# Patient Record
Sex: FEMALE | Race: WHITE | Employment: OTHER | URBAN - METROPOLITAN AREA
[De-identification: names, ages, dates, MRNs, and addresses within clinical notes are randomized per-mention and may not be internally consistent; named-entity substitution may affect disease eponyms.]

---

## 2022-11-23 ENCOUNTER — APPOINTMENT (OUTPATIENT)
Dept: CT IMAGING | Age: 72
End: 2022-11-23
Payer: COMMERCIAL

## 2022-11-23 ENCOUNTER — APPOINTMENT (OUTPATIENT)
Dept: GENERAL RADIOLOGY | Age: 72
End: 2022-11-23
Payer: COMMERCIAL

## 2022-11-23 ENCOUNTER — HOSPITAL ENCOUNTER (EMERGENCY)
Age: 72
Discharge: HOME | End: 2022-11-24
Attending: EMERGENCY MEDICINE
Payer: COMMERCIAL

## 2022-11-23 DIAGNOSIS — U07.1 COVID-19: Primary | ICD-10-CM

## 2022-11-23 LAB
ABSOLUTE EOS #: 0.04 K/UL (ref 0–0.44)
ABSOLUTE IMMATURE GRANULOCYTE: 0.16 K/UL (ref 0–0.3)
ABSOLUTE LYMPH #: 1.02 K/UL (ref 1.1–3.7)
ABSOLUTE MONO #: 1.18 K/UL (ref 0.1–1.2)
ANION GAP SERPL CALCULATED.3IONS-SCNC: 11 MMOL/L (ref 9–17)
BASOPHILS # BLD: 0 % (ref 0–2)
BASOPHILS ABSOLUTE: 0.03 K/UL (ref 0–0.2)
BUN BLDV-MCNC: 12 MG/DL (ref 8–23)
BUN/CREAT BLD: 19 (ref 9–20)
CALCIUM SERPL-MCNC: 9.9 MG/DL (ref 8.6–10.4)
CHLORIDE BLD-SCNC: 97 MMOL/L (ref 98–107)
CO2: 25 MMOL/L (ref 20–31)
CREAT SERPL-MCNC: 0.62 MG/DL (ref 0.5–0.9)
EOSINOPHILS RELATIVE PERCENT: 1 % (ref 1–4)
GFR SERPL CREATININE-BSD FRML MDRD: >60 ML/MIN/1.73M2
GLUCOSE BLD-MCNC: 100 MG/DL (ref 70–99)
HCT VFR BLD CALC: 39.1 % (ref 36.3–47.1)
HEMOGLOBIN: 12.6 G/DL (ref 11.9–15.1)
IMMATURE GRANULOCYTES: 2 %
INFLUENZA A: NOT DETECTED
INFLUENZA B: NOT DETECTED
LACTIC ACID, SEPSIS: 1.2 MMOL/L (ref 0.5–1.9)
LYMPHOCYTES # BLD: 12 % (ref 24–43)
MAGNESIUM: 1.8 MG/DL (ref 1.6–2.6)
MCH RBC QN AUTO: 28.2 PG (ref 25.2–33.5)
MCHC RBC AUTO-ENTMCNC: 32.2 G/DL (ref 28.4–34.8)
MCV RBC AUTO: 87.5 FL (ref 82.6–102.9)
MONOCYTES # BLD: 14 % (ref 3–12)
NRBC AUTOMATED: 0 PER 100 WBC
PDW BLD-RTO: 14.3 % (ref 11.8–14.4)
PLATELET # BLD: 174 K/UL (ref 138–453)
PMV BLD AUTO: 10.4 FL (ref 8.1–13.5)
POTASSIUM SERPL-SCNC: 4.3 MMOL/L (ref 3.7–5.3)
RBC # BLD: 4.47 M/UL (ref 3.95–5.11)
S PYO AG THROAT QL: NEGATIVE
SARS-COV-2 RNA, RT PCR: DETECTED
SEG NEUTROPHILS: 71 % (ref 36–65)
SEGMENTED NEUTROPHILS ABSOLUTE COUNT: 6.13 K/UL (ref 1.5–8.1)
SODIUM BLD-SCNC: 133 MMOL/L (ref 135–144)
SOURCE: ABNORMAL
SOURCE: NORMAL
SPECIMEN DESCRIPTION: ABNORMAL
TROPONIN, HIGH SENSITIVITY: 7 NG/L (ref 0–14)
WBC # BLD: 8.6 K/UL (ref 3.5–11.3)

## 2022-11-23 PROCEDURE — 85025 COMPLETE CBC W/AUTO DIFF WBC: CPT

## 2022-11-23 PROCEDURE — 84484 ASSAY OF TROPONIN QUANT: CPT

## 2022-11-23 PROCEDURE — 83605 ASSAY OF LACTIC ACID: CPT

## 2022-11-23 PROCEDURE — 80164 ASSAY DIPROPYLACETIC ACD TOT: CPT

## 2022-11-23 PROCEDURE — 71045 X-RAY EXAM CHEST 1 VIEW: CPT

## 2022-11-23 PROCEDURE — 70450 CT HEAD/BRAIN W/O DYE: CPT

## 2022-11-23 PROCEDURE — 93005 ELECTROCARDIOGRAM TRACING: CPT | Performed by: EMERGENCY MEDICINE

## 2022-11-23 PROCEDURE — 87880 STREP A ASSAY W/OPTIC: CPT

## 2022-11-23 PROCEDURE — 80048 BASIC METABOLIC PNL TOTAL CA: CPT

## 2022-11-23 PROCEDURE — 87636 SARSCOV2 & INF A&B AMP PRB: CPT

## 2022-11-23 PROCEDURE — 99285 EMERGENCY DEPT VISIT HI MDM: CPT

## 2022-11-23 PROCEDURE — 80165 DIPROPYLACETIC ACID FREE: CPT

## 2022-11-23 PROCEDURE — 6370000000 HC RX 637 (ALT 250 FOR IP): Performed by: EMERGENCY MEDICINE

## 2022-11-23 PROCEDURE — 83735 ASSAY OF MAGNESIUM: CPT

## 2022-11-23 PROCEDURE — 87040 BLOOD CULTURE FOR BACTERIA: CPT

## 2022-11-23 PROCEDURE — 2580000003 HC RX 258: Performed by: EMERGENCY MEDICINE

## 2022-11-23 PROCEDURE — 36415 COLL VENOUS BLD VENIPUNCTURE: CPT

## 2022-11-23 RX ORDER — ACETAMINOPHEN 500 MG
1000 TABLET ORAL ONCE
Status: COMPLETED | OUTPATIENT
Start: 2022-11-23 | End: 2022-11-23

## 2022-11-23 RX ORDER — OMEPRAZOLE 20 MG/1
CAPSULE, DELAYED RELEASE ORAL
COMMUNITY
Start: 2022-10-11

## 2022-11-23 RX ORDER — FERROUS SULFATE 325(65) MG
TABLET ORAL
COMMUNITY
Start: 2022-10-24

## 2022-11-23 RX ORDER — FAMOTIDINE 20 MG/1
TABLET, FILM COATED ORAL
COMMUNITY
Start: 2022-10-11

## 2022-11-23 RX ORDER — 0.9 % SODIUM CHLORIDE 0.9 %
1000 INTRAVENOUS SOLUTION INTRAVENOUS ONCE
Status: COMPLETED | OUTPATIENT
Start: 2022-11-23 | End: 2022-11-23

## 2022-11-23 RX ORDER — DIVALPROEX SODIUM 500 MG/1
TABLET, EXTENDED RELEASE ORAL
COMMUNITY
Start: 2022-11-08

## 2022-11-23 RX ADMIN — SODIUM CHLORIDE 1000 ML: 9 INJECTION, SOLUTION INTRAVENOUS at 22:54

## 2022-11-23 RX ADMIN — ACETAMINOPHEN 1000 MG: 500 TABLET ORAL at 22:35

## 2022-11-23 ASSESSMENT — ENCOUNTER SYMPTOMS
RHINORRHEA: 0
VOMITING: 0
DIARRHEA: 0
NAUSEA: 0
SHORTNESS OF BREATH: 0
COUGH: 1
EYE DISCHARGE: 0
COLOR CHANGE: 0
EYE REDNESS: 0
SORE THROAT: 0

## 2022-11-23 ASSESSMENT — PAIN SCALES - GENERAL
PAINLEVEL_OUTOF10: 0
PAINLEVEL_OUTOF10: 0

## 2022-11-23 ASSESSMENT — PAIN - FUNCTIONAL ASSESSMENT: PAIN_FUNCTIONAL_ASSESSMENT: 0-10

## 2022-11-24 VITALS
DIASTOLIC BLOOD PRESSURE: 68 MMHG | TEMPERATURE: 98.7 F | OXYGEN SATURATION: 94 % | HEART RATE: 85 BPM | RESPIRATION RATE: 21 BRPM | SYSTOLIC BLOOD PRESSURE: 123 MMHG

## 2022-11-24 LAB
BILIRUBIN URINE: NEGATIVE
COLOR: YELLOW
EPITHELIAL CELLS UA: NORMAL /HPF (ref 0–5)
GLUCOSE URINE: NEGATIVE
KETONES, URINE: ABNORMAL
LEUKOCYTE ESTERASE, URINE: NEGATIVE
NITRITE, URINE: NEGATIVE
PH UA: 7 (ref 5–8)
PROTEIN UA: NEGATIVE
RBC UA: NORMAL /HPF (ref 0–2)
SPECIFIC GRAVITY UA: 1.02 (ref 1–1.03)
TURBIDITY: ABNORMAL
URINE HGB: NEGATIVE
UROBILINOGEN, URINE: NORMAL
VALPROIC ACID % FREE: 9.2 % (ref 5–18.4)
VALPROIC ACID LEVEL: 100 UG/ML (ref 50–125)
VALPROIC ACID, FREE: 9.2 UG/ML (ref 7–23)
WBC UA: NORMAL /HPF (ref 0–5)

## 2022-11-24 PROCEDURE — 81001 URINALYSIS AUTO W/SCOPE: CPT

## 2022-11-24 RX ORDER — NIRMATRELVIR AND RITONAVIR 300-100 MG
KIT ORAL
Qty: 30 TABLET | Refills: 0 | Status: SHIPPED | OUTPATIENT
Start: 2022-11-24 | End: 2022-11-24 | Stop reason: SDUPTHER

## 2022-11-24 RX ORDER — NIRMATRELVIR AND RITONAVIR 300-100 MG
KIT ORAL
Qty: 30 TABLET | Refills: 0 | Status: SHIPPED | OUTPATIENT
Start: 2022-11-24 | End: 2022-11-29

## 2022-11-24 NOTE — ED NOTES
Pt brought to ED by private auto by . Pt had syncopal episode around 1800. Pt stated she had just returned home from visiting daughter. Pt does not remember episode. Pt had hx of seizures. Pt is on Depakote. Per pt , pt has not had seizure in 20 years. Pt febrile upon arrival. Pt denies SOB. Pt denies hitting head. Pt is not on blood thinners.  Pt alert and oriented upon arrival.          Layjelani Willard RN  11/24/22 [id number]

## 2022-11-24 NOTE — ED PROVIDER NOTES
EMERGENCY DEPARTMENT ENCOUNTER    Pt Name: Raisa Mcelroy  MRN: [id number]  [place] 1950  Date of evaluation: 11/23/22  CHIEF COMPLAINT       Chief Complaint   Patient presents with    Loss of Consciousness     Pt blacked out at home. Hx of seizures. No Seizure in 20 years. On Depakote. No Cardiac hx. HISTORY OF PRESENT ILLNESS   This is a 81-year-old female, history of seizure disorder that presents with complaints of fever, generalized weakness, a syncopal episode and not feeling well. Per the patient and the family and for the past few days the patient has been dealing with a dry cough, scratchy throat and just has not felt well. Her symptoms worsened this evening when she had a syncopal episode. Patient does not really remember what happened, she does have a seizure history but does not feel like this was a seizure. Patient denies any previous history of cardiac disease. Patient was noted to have a low-grade temperature in triage. She denies any known exposure to COVID-19 or influenza. REVIEW OF SYSTEMS     Review of Systems   Constitutional:  Positive for chills, fatigue and fever. HENT:  Positive for congestion. Negative for rhinorrhea and sore throat. Eyes:  Negative for discharge, redness and visual disturbance. Respiratory:  Positive for cough. Negative for shortness of breath. Cardiovascular:  Negative for chest pain, palpitations and leg swelling. Gastrointestinal:  Negative for diarrhea, nausea and vomiting. Musculoskeletal:  Negative for arthralgias, myalgias and neck pain. Skin:  Negative for color change and rash. Neurological:  Positive for syncope and weakness. Negative for seizures and headaches. Psychiatric/Behavioral:  Negative for hallucinations, self-injury and suicidal ideas. PASTMEDICAL HISTORY   No past medical history on file. SURGICAL HISTORY     No past surgical history on file.   CURRENT MEDICATIONS       Current Discharge Medication List CONTINUE these medications which have NOT CHANGED    Details   divalproex (DEPAKOTE ER) 500 MG extended release tablet TAKE 2 TABLETS BY MOUTH DAILY      famotidine (PEPCID) 20 MG tablet TAKE 1 TABLET BY MOUTH ONCE DAILY BEFORE EVENING MEAL      FEROSUL 325 (65 Fe) MG tablet TAKE 1 TABLET BY MOUTH ONCE DAILY AS DIRECTED      omeprazole (PRILOSEC) 20 MG delayed release capsule TAKE 1 CAPSULE BY MOUTH ONCE DAILY 30 MINUTES BEFORE MORNING MEAL           ALLERGIES     has No Known Allergies. FAMILY HISTORY     has no family status information on file. SOCIAL HISTORY        PHYSICAL EXAM     INITIAL VITALS: /68   Pulse 85   Temp 98.7 °F (37.1 °C) (Oral)   Resp 21   SpO2 94%    Physical Exam  Constitutional:       Appearance: Normal appearance. She is well-developed. She is not ill-appearing or toxic-appearing. HENT:      Head: Normocephalic and atraumatic. Eyes:      Conjunctiva/sclera: Conjunctivae normal.      Pupils: Pupils are equal, round, and reactive to light. Neck:      Trachea: Trachea normal.   Cardiovascular:      Rate and Rhythm: Normal rate and regular rhythm. Heart sounds: S1 normal and S2 normal. No murmur heard. Pulmonary:      Effort: Pulmonary effort is normal. No accessory muscle usage or respiratory distress. Breath sounds: Normal breath sounds. Chest:      Chest wall: No deformity or tenderness. Abdominal:      General: Bowel sounds are normal. There is no distension or abdominal bruit. Palpations: Abdomen is not rigid. Tenderness: There is no abdominal tenderness. There is no guarding or rebound. Musculoskeletal:      Cervical back: Normal range of motion and neck supple. Skin:     General: Skin is warm. Findings: No rash. Neurological:      Mental Status: She is alert and oriented to person, place, and time. GCS: GCS eye subscore is 4. GCS verbal subscore is 5. GCS motor subscore is 6.    Psychiatric:         Speech: Speech normal. MEDICAL DECISION MAKIN-year-old female presents with complaints of generalized weakness, syncopal episode, low-grade fever. Plan is basic labs rule out sepsis provide IV fluids obtain a chest x-ray and reevaluate. 12:55 AM EST  Patient is COVID-positive, she appears mildly dehydrated I think this is the reason for the syncope, it appears to been orthostatic in nature. Patient be discharged with Paxil bid, outpatient follow-up with PCP and return if symptoms worsen or change. HEART SCORE: Not indicated                                                                                                                                                                                                                                                                                                                                                                                                                                                All patient's question's and concerns were answered prior to disposition and patient and/or family expressed understanding and agreement of treatment plan. CRITICAL CARE:              NIH STROKE SCALE:            PROCEDURES:    Procedures    DIAGNOSTIC RESULTS   EKG:All EKG's are interpreted by the Emergency Department Physician who either signs or Co-signs this chart in the absence of a cardiologist.    Patient's EKG shows sinus rhythm rate of 94, [place] QRS QTC intervals unremarkable patient has left axis deviation no ST elevations or depressions, nonspecific EKG. RADIOLOGY:All plain film, CT, MRI, and formal ultrasound images (except ED bedside ultrasound) are read by the radiologist, see reports below, unless otherwisenoted in MDM or here. XR CHEST PORTABLE   Final Result      Lungs are clear         CT HEAD WO CONTRAST   Final Result      No evidence for acute intracranial hemorrhage, territorial infarction or   intracranial mass lesion.       Mild chronic qualify for COVID-19 antIviral therapy based on criteria for treatment? Answer:   Yes    nirmatrelvir/ritonavir (PAXLOVID, 300/100,) 20 x 150 MG & 10 x 100MG TBPK     Sig: Take 3 tablets (two 150 mg nirmatrelvir and one 100 mg ritonavir tablets) by mouth every 12 hours for 5 days. Dispense:  30 tablet     Refill:  0     Order Specific Question:   Does this patient qualify for COVID-19 antIviral therapy based on criteria for treatment? Answer:   Yes     CONSULTS:  None    FINAL IMPRESSION      1. COVID-19          DISPOSITION/PLAN   DISPOSITION Decision To Discharge 11/24/2022 12:42:21 AM      PATIENT REFERRED TO:  [place] [id number]  [phone]  Schedule an appointment as soon as possible for a visit in 2 days    DISCHARGE MEDICATIONS:  Current Discharge Medication List        START taking these medications    Details   nirmatrelvir/ritonavir (PAXLOVID, 300/100,) 20 x 150 MG & 10 x 100MG TBPK Take 3 tablets (two 150 mg nirmatrelvir and one 100 mg ritonavir tablets) by mouth every 12 hours for 5 days. Qty: 30 tablet, Refills: 0    Associated Diagnoses: Curt Laird MD  Attending Emergency Physician      The care is provided during an unprecedented national emergency due to the novel coronavirus, COVID 19. This note was created with the assistance of a speech-recognition program. While intending to generate a document that actually reflects the content of the visit, no guarantees can be provided that every mistake has been identified and corrected by editing.     Himanshu Read MD  11/24/22 9782

## 2022-11-26 LAB
EKG ATRIAL RATE: 94 BPM
EKG P AXIS: 31 DEGREES
EKG P-R INTERVAL: 168 MS
EKG Q-T INTERVAL: 376 MS
EKG QRS DURATION: 120 MS
EKG QTC CALCULATION (BAZETT): 470 MS
EKG R AXIS: -43 DEGREES
EKG VENTRICULAR RATE: 94 BPM

## 2022-11-26 PROCEDURE — 93010 ELECTROCARDIOGRAM REPORT: CPT | Performed by: INTERNAL MEDICINE
